# Patient Record
Sex: FEMALE | Race: ASIAN | Employment: OTHER | URBAN - METROPOLITAN AREA
[De-identification: names, ages, dates, MRNs, and addresses within clinical notes are randomized per-mention and may not be internally consistent; named-entity substitution may affect disease eponyms.]

---

## 2017-02-24 ENCOUNTER — LAB ENCOUNTER (OUTPATIENT)
Dept: LAB | Age: 72
End: 2017-02-24
Attending: INTERNAL MEDICINE
Payer: COMMERCIAL

## 2017-02-24 DIAGNOSIS — E55.9 VITAMIN D DEFICIENCY DISEASE: Primary | ICD-10-CM

## 2017-02-24 DIAGNOSIS — D64.9 ANEMIA: ICD-10-CM

## 2017-02-24 DIAGNOSIS — E03.9 HYPOTHYROIDISM: ICD-10-CM

## 2017-02-24 LAB
BASOPHILS # BLD: 0 K/UL (ref 0–0.2)
BASOPHILS NFR BLD: 1 %
EOSINOPHIL # BLD: 0.1 K/UL (ref 0–0.7)
EOSINOPHIL NFR BLD: 2 %
ERYTHROCYTE [DISTWIDTH] IN BLOOD BY AUTOMATED COUNT: 12.7 % (ref 11–15)
HCT VFR BLD AUTO: 36.2 % (ref 35–48)
HGB BLD-MCNC: 11.9 G/DL (ref 12–16)
LYMPHOCYTES # BLD: 1.3 K/UL (ref 1–4)
LYMPHOCYTES NFR BLD: 33 %
MCH RBC QN AUTO: 29.8 PG (ref 27–32)
MCHC RBC AUTO-ENTMCNC: 32.8 G/DL (ref 32–37)
MCV RBC AUTO: 90.9 FL (ref 80–100)
MONOCYTES # BLD: 0.3 K/UL (ref 0–1)
MONOCYTES NFR BLD: 8 %
NEUTROPHILS # BLD AUTO: 2.2 K/UL (ref 1.8–7.7)
NEUTROPHILS NFR BLD: 57 %
PLATELET # BLD AUTO: 223 K/UL (ref 140–400)
PMV BLD AUTO: 9.3 FL (ref 7.4–10.3)
RBC # BLD AUTO: 3.98 M/UL (ref 3.7–5.4)
TSH SERPL-ACNC: 3.77 UIU/ML (ref 0.34–5.6)
WBC # BLD AUTO: 3.8 K/UL (ref 4–11)

## 2017-02-24 PROCEDURE — 83921 ORGANIC ACID SINGLE QUANT: CPT

## 2017-02-24 PROCEDURE — 36415 COLL VENOUS BLD VENIPUNCTURE: CPT

## 2017-02-24 PROCEDURE — 84443 ASSAY THYROID STIM HORMONE: CPT

## 2017-02-24 PROCEDURE — 85025 COMPLETE CBC W/AUTO DIFF WBC: CPT

## 2017-02-24 PROCEDURE — 82306 VITAMIN D 25 HYDROXY: CPT

## 2017-02-27 LAB
25(OH)D3 SERPL-MCNC: 30.5 NG/ML
MMA: 0.13 UMOL/L

## 2017-08-26 ENCOUNTER — LAB ENCOUNTER (OUTPATIENT)
Dept: LAB | Age: 72
End: 2017-08-26
Attending: INTERNAL MEDICINE
Payer: MEDICARE

## 2017-08-26 DIAGNOSIS — E03.9 HYPOTHYROIDISM: ICD-10-CM

## 2017-08-26 DIAGNOSIS — Z00.00 ROUTINE GENERAL MEDICAL EXAMINATION AT A HEALTH CARE FACILITY: Primary | ICD-10-CM

## 2017-08-26 DIAGNOSIS — D64.9 ABSOLUTE ANEMIA: ICD-10-CM

## 2017-08-26 DIAGNOSIS — E55.9 AVITAMINOSIS D: ICD-10-CM

## 2017-08-26 LAB
ALBUMIN SERPL BCP-MCNC: 4 G/DL (ref 3.5–4.8)
ALBUMIN/GLOB SERPL: 1.4 {RATIO} (ref 1–2)
ALP SERPL-CCNC: 51 U/L (ref 32–100)
ALT SERPL-CCNC: 15 U/L (ref 14–54)
ANION GAP SERPL CALC-SCNC: 6 MMOL/L (ref 0–18)
AST SERPL-CCNC: 25 U/L (ref 15–41)
BASOPHILS # BLD: 0 K/UL (ref 0–0.2)
BASOPHILS NFR BLD: 1 %
BILIRUB SERPL-MCNC: 1 MG/DL (ref 0.3–1.2)
BUN SERPL-MCNC: 11 MG/DL (ref 8–20)
BUN/CREAT SERPL: 14.3 (ref 10–20)
CALCIUM SERPL-MCNC: 9.7 MG/DL (ref 8.5–10.5)
CHLORIDE SERPL-SCNC: 104 MMOL/L (ref 95–110)
CHOLEST SERPL-MCNC: 213 MG/DL (ref 110–200)
CO2 SERPL-SCNC: 28 MMOL/L (ref 22–32)
CREAT SERPL-MCNC: 0.77 MG/DL (ref 0.5–1.5)
EOSINOPHIL # BLD: 0.1 K/UL (ref 0–0.7)
EOSINOPHIL NFR BLD: 2 %
ERYTHROCYTE [DISTWIDTH] IN BLOOD BY AUTOMATED COUNT: 13.4 % (ref 11–15)
GLOBULIN PLAS-MCNC: 2.9 G/DL (ref 2.5–3.7)
GLUCOSE SERPL-MCNC: 85 MG/DL (ref 70–99)
HCT VFR BLD AUTO: 35.9 % (ref 35–48)
HDLC SERPL-MCNC: 87 MG/DL
HGB BLD-MCNC: 12 G/DL (ref 12–16)
LDLC SERPL CALC-MCNC: 118 MG/DL (ref 0–99)
LYMPHOCYTES # BLD: 1.5 K/UL (ref 1–4)
LYMPHOCYTES NFR BLD: 36 %
MCH RBC QN AUTO: 30 PG (ref 27–32)
MCHC RBC AUTO-ENTMCNC: 33.3 G/DL (ref 32–37)
MCV RBC AUTO: 90.1 FL (ref 80–100)
MONOCYTES # BLD: 0.4 K/UL (ref 0–1)
MONOCYTES NFR BLD: 8 %
NEUTROPHILS # BLD AUTO: 2.2 K/UL (ref 1.8–7.7)
NEUTROPHILS NFR BLD: 52 %
NONHDLC SERPL-MCNC: 126 MG/DL
OSMOLALITY UR CALC.SUM OF ELEC: 285 MOSM/KG (ref 275–295)
PLATELET # BLD AUTO: 209 K/UL (ref 140–400)
PMV BLD AUTO: 9.4 FL (ref 7.4–10.3)
POTASSIUM SERPL-SCNC: 5 MMOL/L (ref 3.3–5.1)
PROT SERPL-MCNC: 6.9 G/DL (ref 5.9–8.4)
RBC # BLD AUTO: 3.99 M/UL (ref 3.7–5.4)
SODIUM SERPL-SCNC: 138 MMOL/L (ref 136–144)
TRIGL SERPL-MCNC: 40 MG/DL (ref 1–149)
TSH SERPL-ACNC: 3.28 UIU/ML (ref 0.45–5.33)
WBC # BLD AUTO: 4.3 K/UL (ref 4–11)

## 2017-08-26 PROCEDURE — 80061 LIPID PANEL: CPT

## 2017-08-26 PROCEDURE — 85025 COMPLETE CBC W/AUTO DIFF WBC: CPT

## 2017-08-26 PROCEDURE — 84443 ASSAY THYROID STIM HORMONE: CPT

## 2017-08-26 PROCEDURE — 80053 COMPREHEN METABOLIC PANEL: CPT

## 2017-08-26 PROCEDURE — 36415 COLL VENOUS BLD VENIPUNCTURE: CPT

## 2017-08-26 PROCEDURE — 82306 VITAMIN D 25 HYDROXY: CPT

## 2017-08-28 LAB — 25(OH)D3 SERPL-MCNC: 32.5 NG/ML

## 2018-05-05 ENCOUNTER — LAB ENCOUNTER (OUTPATIENT)
Dept: LAB | Age: 73
End: 2018-05-05
Attending: INTERNAL MEDICINE
Payer: COMMERCIAL

## 2018-05-05 DIAGNOSIS — E78.6 FAMILIAL LIPOPROTEIN DEFICIENCY: Primary | ICD-10-CM

## 2018-05-05 DIAGNOSIS — E78.5 HYPERLIPEMIA: ICD-10-CM

## 2018-05-05 DIAGNOSIS — E55.9 AVITAMINOSIS D: ICD-10-CM

## 2018-05-05 DIAGNOSIS — D64.9 ANEMIA: ICD-10-CM

## 2018-05-05 DIAGNOSIS — E03.9 HYPOTHYROIDISM: ICD-10-CM

## 2018-05-05 PROCEDURE — 84443 ASSAY THYROID STIM HORMONE: CPT

## 2018-05-05 PROCEDURE — 36415 COLL VENOUS BLD VENIPUNCTURE: CPT

## 2018-05-05 PROCEDURE — 82306 VITAMIN D 25 HYDROXY: CPT

## 2018-05-05 PROCEDURE — 80061 LIPID PANEL: CPT

## 2018-05-05 PROCEDURE — 80053 COMPREHEN METABOLIC PANEL: CPT

## 2018-05-05 PROCEDURE — 85025 COMPLETE CBC W/AUTO DIFF WBC: CPT

## 2018-10-22 ENCOUNTER — APPOINTMENT (OUTPATIENT)
Dept: ULTRASOUND IMAGING | Facility: HOSPITAL | Age: 73
DRG: 312 | End: 2018-10-22
Attending: Other
Payer: MEDICARE

## 2018-10-22 ENCOUNTER — APPOINTMENT (OUTPATIENT)
Dept: GENERAL RADIOLOGY | Facility: HOSPITAL | Age: 73
DRG: 312 | End: 2018-10-22
Attending: EMERGENCY MEDICINE
Payer: MEDICARE

## 2018-10-22 ENCOUNTER — APPOINTMENT (OUTPATIENT)
Dept: CT IMAGING | Facility: HOSPITAL | Age: 73
DRG: 312 | End: 2018-10-22
Attending: EMERGENCY MEDICINE
Payer: MEDICARE

## 2018-10-22 ENCOUNTER — APPOINTMENT (OUTPATIENT)
Dept: MRI IMAGING | Facility: HOSPITAL | Age: 73
DRG: 312 | End: 2018-10-22
Attending: Other
Payer: MEDICARE

## 2018-10-22 ENCOUNTER — HOSPITAL ENCOUNTER (INPATIENT)
Facility: HOSPITAL | Age: 73
LOS: 1 days | Discharge: HOME | DRG: 312 | End: 2018-10-23
Attending: EMERGENCY MEDICINE | Admitting: INTERNAL MEDICINE
Payer: MEDICARE

## 2018-10-22 ENCOUNTER — APPOINTMENT (OUTPATIENT)
Dept: CV DIAGNOSTICS | Facility: HOSPITAL | Age: 73
DRG: 312 | End: 2018-10-22
Attending: INTERNAL MEDICINE
Payer: MEDICARE

## 2018-10-22 DIAGNOSIS — R55 SYNCOPE AND COLLAPSE: Primary | ICD-10-CM

## 2018-10-22 DIAGNOSIS — Z86.73 HISTORY OF STROKE: ICD-10-CM

## 2018-10-22 PROCEDURE — 71045 X-RAY EXAM CHEST 1 VIEW: CPT | Performed by: EMERGENCY MEDICINE

## 2018-10-22 PROCEDURE — 95816 EEG AWAKE AND DROWSY: CPT | Performed by: OTHER

## 2018-10-22 PROCEDURE — 72125 CT NECK SPINE W/O DYE: CPT | Performed by: EMERGENCY MEDICINE

## 2018-10-22 PROCEDURE — 73110 X-RAY EXAM OF WRIST: CPT | Performed by: EMERGENCY MEDICINE

## 2018-10-22 PROCEDURE — 93306 TTE W/DOPPLER COMPLETE: CPT | Performed by: INTERNAL MEDICINE

## 2018-10-22 PROCEDURE — 70553 MRI BRAIN STEM W/O & W/DYE: CPT | Performed by: OTHER

## 2018-10-22 PROCEDURE — 99223 1ST HOSP IP/OBS HIGH 75: CPT | Performed by: OTHER

## 2018-10-22 PROCEDURE — 93880 EXTRACRANIAL BILAT STUDY: CPT | Performed by: OTHER

## 2018-10-22 PROCEDURE — 70450 CT HEAD/BRAIN W/O DYE: CPT | Performed by: EMERGENCY MEDICINE

## 2018-10-22 RX ORDER — LEVOTHYROXINE SODIUM 0.05 MG/1
50 TABLET ORAL
Status: DISCONTINUED | OUTPATIENT
Start: 2018-10-22 | End: 2018-10-23

## 2018-10-22 RX ORDER — 0.9 % SODIUM CHLORIDE 0.9 %
VIAL (ML) INJECTION
Status: COMPLETED
Start: 2018-10-22 | End: 2018-10-22

## 2018-10-22 RX ORDER — ASPIRIN 81 MG/1
81 TABLET ORAL DAILY
Status: DISCONTINUED | OUTPATIENT
Start: 2018-10-22 | End: 2018-10-23

## 2018-10-22 RX ORDER — PRAMIPEXOLE DIHYDROCHLORIDE 0.12 MG/1
0.12 TABLET ORAL NIGHTLY
COMMUNITY

## 2018-10-22 RX ORDER — ACETAMINOPHEN 325 MG/1
650 TABLET ORAL EVERY 6 HOURS PRN
Status: DISCONTINUED | OUTPATIENT
Start: 2018-10-22 | End: 2018-10-23

## 2018-10-22 RX ORDER — PRAMIPEXOLE DIHYDROCHLORIDE 0.12 MG/1
0.12 TABLET ORAL NIGHTLY
Status: DISCONTINUED | OUTPATIENT
Start: 2018-10-22 | End: 2018-10-23

## 2018-10-22 RX ORDER — HYDROCODONE BITARTRATE AND ACETAMINOPHEN 5; 325 MG/1; MG/1
1 TABLET ORAL EVERY 6 HOURS PRN
Status: DISCONTINUED | OUTPATIENT
Start: 2018-10-22 | End: 2018-10-23

## 2018-10-22 RX ORDER — LEVOTHYROXINE SODIUM 0.05 MG/1
50 TABLET ORAL
COMMUNITY

## 2018-10-22 NOTE — ED NOTES
Pt presents with family,  states pt was in the bathroom getting ready to take a shower when he heard a thud. Found pt laying on floor, unknown LOC or head trauma. Pt vomit x 2.   Swelling noted to right wrist.

## 2018-10-22 NOTE — ED INITIAL ASSESSMENT (HPI)
Per family, pt had a syncopal episode while getting up to go to bathroom this am at 0730. Unknown loc. Unknown head injury. Pt has vomited x 2 since. Per pt , he heard pt fall and found her on the bathroom floor. Pt a/o x 4 now. Appears sleepy.

## 2018-10-22 NOTE — CONSULTS
[facility] HOSP - [facility]    Report of Consultation    Alma Rodriguez Patient Status:  Inpatient    3/15/1945 MRN [id number]   Location [facility] 3W/SW Attending Baxter Lefort, MD   Hosp Day # 0 PCP Malcolm Kay MD     Date of Admission:  10/ Allergies    Review of Systems:   As in HPI, the rest of the 14 system review was done and was negative    Physical Exam:      10/22/18  0945 10/22/18  1000 10/22/18  1114 10/22/18  1144   BP: 160/82 (!) 165/90 135/71 151/81   Pulse: 61 73 63 65   Resp: 15 test    Results:     Laboratory Data:  Lab Results   Component Value Date    WBC 5.9 10/22/2018    HGB 11.8 (L) 10/22/2018    HCT 35.8 10/22/2018     10/22/2018    CREATSERUM 0.80 10/22/2018    BUN 9 10/22/2018     10/22/2018    K 4.6 10/22/20 scarring/atelectasis. Moderate thoracolumbar scoliosis.      Dictated by (CST): Glenn Mckeon MD on 10/22/2018 at 9:18     Approved by (CST): Glenn Mckeon MD on 10/22/2018 at 9:20          Ekg 12-lead    Result Date: 10/22/2018  ECG Report  Interpretatio

## 2018-10-22 NOTE — ED PROVIDER NOTES
Patient Seen in: [facility] AND [facility] Emergency Department    History   Patient presents with:  Syncope (cardiovascular, neurologic)    Stated Complaint: fall this am at 0730 while going to bathroom    HPI    78-year-old female presents for evaluation after Conjunctivae and EOM are normal. Pupils are equal, round, and reactive to light. Neck: Normal range of motion. Neck supple. Cardiovascular: Normal rate, regular rhythm and intact distal pulses. Murmur heard.   Radial and pedal pulses 2+ bilaterally EKG    Rate, intervals and axes as noted on EKG Report.     QRS 99  Rate: 56  Rhythm: Sinus Rhythm  Reading: Sinus bradycardia              Pulse Ox: 99%, Normal, RA    Cardiac Monitor: Pulse Readings from Last 1 Encounters:  10/22/18 : 67 Sonam Salazar MD on 10/22/2018 at 16:52     Approved by (CST): Mey Hernández MD on 10/22/2018 at 16:58          Us Carotid Doppler Bilat - Diag Img (cpt=93880)    Result Date: 10/22/2018  CONCLUSION:  1.  There is significant area stenosis or significant plaqu

## 2018-10-22 NOTE — CONSULTS
[facility] HOSP - [facility]    Report of Cardiology Consultation    Darell Hidalgo Patient Status:  Emergency    3/15/1945 MRN [id number]   Location [address] Attending Adriel Sanchez MD   [facility] Day # 0 PCP [facility], oriented. No apparent distress. No respiratory or constitutional distress. HEENT: Normocephalic, anicteric sclera, neck supple  Neck: No JVD, carotids 2+,  Cardiac: Regular rate and rhythm. No pathologic murmur. Lungs: Clear with normal effort.   Normal e

## 2018-10-22 NOTE — PROCEDURES
EEG report    REFERRING PHYSICIAN: Magdiel Gee MD    PCP and phone number:  Aravind Otero MD  [phone]    TECHNIQUE: 21 channels of EEG, 2 channels of EOG, and 1 channel of EKG were recorded utilizing the International 10/20 System.  The recording wa

## 2018-10-22 NOTE — H&P
[facility] Patient Status:  Inpatient    3/15/1945 MRN [id number]   Location [facility] 3W/SW Attending Jesenia Goetz MD   Hosp Day # 0 PCP Yomi Vazquez MD     Date:  10/22/2018  Date of Clear to auscultation bilaterally, respirations unlabored    Heart:    Regular rate and rhythm, S1 and S2 normal, no murmur, rub   or gallop   Abdomen:     Soft, non-tender, bowel sounds active all four quadrants,     no masses, no organomegaly   Extremiti based on the clinical documentation in H+P. Based on patients current state of illness, I anticipate that, after discharge, patient will require TBD.       [facility]  10/22/2018  12:48 PM

## 2018-10-23 ENCOUNTER — APPOINTMENT (OUTPATIENT)
Dept: GENERAL RADIOLOGY | Facility: HOSPITAL | Age: 73
DRG: 312 | End: 2018-10-23
Attending: ORTHOPAEDIC SURGERY
Payer: MEDICARE

## 2018-10-23 VITALS
WEIGHT: 105.19 LBS | DIASTOLIC BLOOD PRESSURE: 61 MMHG | HEART RATE: 71 BPM | OXYGEN SATURATION: 99 % | HEIGHT: 62 IN | BODY MASS INDEX: 19.36 KG/M2 | SYSTOLIC BLOOD PRESSURE: 116 MMHG | RESPIRATION RATE: 18 BRPM | TEMPERATURE: 98 F

## 2018-10-23 PROBLEM — S62.101A WRIST FRACTURE, RIGHT: Status: ACTIVE | Noted: 2018-10-23

## 2018-10-23 PROBLEM — G25.81 RLS (RESTLESS LEGS SYNDROME): Chronic | Status: ACTIVE | Noted: 2018-10-23

## 2018-10-23 PROCEDURE — 73110 X-RAY EXAM OF WRIST: CPT | Performed by: ORTHOPAEDIC SURGERY

## 2018-10-23 PROCEDURE — 99232 SBSQ HOSP IP/OBS MODERATE 35: CPT | Performed by: OTHER

## 2018-10-23 RX ORDER — MORPHINE SULFATE 2 MG/ML
2 INJECTION, SOLUTION INTRAMUSCULAR; INTRAVENOUS ONCE
Status: COMPLETED | OUTPATIENT
Start: 2018-10-23 | End: 2018-10-23

## 2018-10-23 RX ORDER — ATORVASTATIN CALCIUM 10 MG/1
10 TABLET, FILM COATED ORAL NIGHTLY
Qty: 30 TABLET | Refills: 0 | Status: SHIPPED | OUTPATIENT
Start: 2018-10-23

## 2018-10-23 RX ORDER — ASPIRIN 81 MG/1
81 TABLET ORAL DAILY
Qty: 30 TABLET | Refills: 0 | Status: SHIPPED | OUTPATIENT
Start: 2018-10-24

## 2018-10-23 RX ORDER — ATORVASTATIN CALCIUM 10 MG/1
10 TABLET, FILM COATED ORAL NIGHTLY
Status: DISCONTINUED | OUTPATIENT
Start: 2018-10-23 | End: 2018-10-23

## 2018-10-23 RX ORDER — ACETAMINOPHEN 325 MG/1
650 TABLET ORAL EVERY 6 HOURS PRN
Qty: 30 TABLET | Refills: 0 | Status: SHIPPED | OUTPATIENT
Start: 2018-10-23

## 2018-10-23 NOTE — PROGRESS NOTES
[facility]    Cardiology Progress Note    Gerhardt Genta Patient Status:  Inpatient    3/15/1945 MRN [id number]   Location [facility] 3W/SW Attending Jen Parker MD   Hosp Day # 1 PCP Zulay Esquivel MD         Assessment and Pl ulnar styloid. 2. Osteoarthritis. 3. Demineralization. 4. Soft tissue swelling. Dictated by (CST): Gaby Colon MD on 10/22/2018 at 9:32     Approved by (CST):  Gaby Colon MD on 10/22/2018 at [facility] ([id number])    Result 50-69%. The elevation of velocity at this site may also be related to the tortuosity of the mid left ICA. Correlate clinically and with possible followup CTA/MRA may be of help.     Dictated by (CST): Lisa Kelly MD on 10/22/2018 at 17:38     Approved by

## 2018-10-23 NOTE — PROGRESS NOTES
[facility]    Progress Note    Chema Cowing Patient Status:  Inpatient    3/15/1945 MRN [id number]   Location [facility] 3W/SW Attending Nisreen Butler MD   Hosp Day # 1 PCP Janette John MD       Interval History:   Awake, c/o for input(s): ALKPHO, AST, ALT, BILT, TP, PT, INR, PTT in the last 168 hours. Xr Wrist Complete (min 3 Views), Right (cpt=73110)    Result Date: 10/22/2018  CONCLUSION:  1. Fractures of the distal radius and ulnar styloid. 2. Osteoarthritis.  3. Jenny Nichols significant area stenosis or significant plaque in the right carotid system as discussed. 2. There is elevation of the velocity of flow at the mid left ICA which may suggest a degree of stenosis of between 50-69%.  The elevation of velocity at this site may

## 2018-10-23 NOTE — TRANSITION NOTE
For Cardiology Office:    Date of Admission:  10/22/2018  Date of Consult:  10/22/2018     Impression:      1) Syncope                C/w VVS                  2) Abnl ekg                Poor R wave progression                Ordered echo to rule out LV dys

## 2018-10-23 NOTE — PROGRESS NOTES
Pt d/c stable with family, home via private vehicle. Pt and family educated on use of sling for casted arm; pt/family demonstrated knowledge of use of sling and proper elevation of arm when lying down.

## 2018-10-23 NOTE — PROGRESS NOTES
[facility]  Neurology Progress Note    Jeevan Vásquez Patient Status:  Inpatient    3/15/1945 MRN [id number]   Location [facility] 3W/SW Attending Tapan Soliz MD   Hosp Day # 1 PCP Dolph Hammans, MD     Subjective:  Jeevan Vásquez is a elevates symmetrically. XI. Shoulder shrug is intact  XII.  Tongue is midline    Motor Exam:  Muscle tone normal  No atrophy or fasciculations  Strength- upper extremities 5/5 proximally and distally, except unable to test the right arm due to the splint Multilevel degenerative disc disease and spondylosis. Mild central narrowing at C3-4. 3. Atherosclerotic vascular calcification.      Dictated by (CST): Kailash Chan MD on 10/22/2018 at 10:20     Approved by (CST): Kailash Chan MD on 10/22/2018 at 10:25 Lita Saxena MD    MRI of the brain was independently reviewed, still the same old lacunar stroke noted in the left internal capsule, but no acute abnormalities.     Assessment:  Patient Active Problem List:     Syncope and collapse     Wrist fracture, right

## 2018-10-23 NOTE — PLAN OF CARE
Problem: Patient/Family Goals  Goal: Patient/Family Long Term Goal  Patient's Long Term Goal: no more syncope episode. Interventions:    - monitor vitals . - ct scan negative. - follow up with PCP.    - See additional Care Plan goals for specific

## 2018-10-23 NOTE — CONSULTS
Alicja Chong    75year old  Wt Readings from Last 6 Encounters:  10/23/18 : 105 lb 3.2 oz (47.7 kg)        Patient presents with:  Syncope (cardiovascular, neurologic)      HPI    Past Medical History:   Diagnosis Date   • Disorder of thyroid    • RLS refill. Compartments soft.     Lab Results   Component Value Date    WBC 6.7 10/23/2018    HGB 11.2 10/23/2018    HCT 33.9 10/23/2018     10/23/2018    CREATSERUM 0.67 10/23/2018    BUN 8 10/23/2018     10/23/2018    K 3.9 10/23/2018     infarction involving anterior limb of the left internal capsule.      Dictated by (CST): Silvano Almazan MD on 10/22/2018 at 16:52     Approved by (CST): Silvano Almazan MD on 10/22/2018 at 16:58           Carotid Doppler Bilat - Diag Img (cpt=93880)    R

## 2020-11-02 ENCOUNTER — TELEPHONE (OUTPATIENT)
Dept: NEUROSURGERY | Age: 75
End: 2020-11-02

## 2020-11-02 ENCOUNTER — APPOINTMENT (OUTPATIENT)
Dept: NEUROSURGERY | Age: 75
End: 2020-11-02

## 2020-11-02 DIAGNOSIS — M54.50 LOW BACK PAIN, UNSPECIFIED BACK PAIN LATERALITY, UNSPECIFIED CHRONICITY, UNSPECIFIED WHETHER SCIATICA PRESENT: ICD-10-CM

## 2020-11-02 DIAGNOSIS — M79.605 LEFT LEG PAIN: Primary | ICD-10-CM

## 2020-11-03 ENCOUNTER — OFFICE VISIT (OUTPATIENT)
Dept: NEUROSURGERY | Age: 75
End: 2020-11-03

## 2020-11-03 ENCOUNTER — TELEPHONE (OUTPATIENT)
Dept: SCHEDULING | Age: 75
End: 2020-11-03

## 2020-11-03 ENCOUNTER — TELEPHONE (OUTPATIENT)
Dept: NEUROSURGERY | Age: 75
End: 2020-11-03

## 2020-11-03 ENCOUNTER — IMAGING SERVICES (OUTPATIENT)
Dept: GENERAL RADIOLOGY | Age: 75
End: 2020-11-03

## 2020-11-03 VITALS
HEART RATE: 65 BPM | HEIGHT: 61 IN | TEMPERATURE: 97 F | RESPIRATION RATE: 18 BRPM | BODY MASS INDEX: 20.01 KG/M2 | DIASTOLIC BLOOD PRESSURE: 93 MMHG | WEIGHT: 106 LBS | SYSTOLIC BLOOD PRESSURE: 195 MMHG

## 2020-11-03 DIAGNOSIS — M54.50 LOW BACK PAIN, UNSPECIFIED BACK PAIN LATERALITY, UNSPECIFIED CHRONICITY, UNSPECIFIED WHETHER SCIATICA PRESENT: ICD-10-CM

## 2020-11-03 DIAGNOSIS — M41.55 OTHER SECONDARY SCOLIOSIS, THORACOLUMBAR REGION: ICD-10-CM

## 2020-11-03 DIAGNOSIS — M54.42 ACUTE LEFT-SIDED LOW BACK PAIN WITH LEFT-SIDED SCIATICA: Primary | ICD-10-CM

## 2020-11-03 DIAGNOSIS — M79.605 LEFT LEG PAIN: ICD-10-CM

## 2020-11-03 PROCEDURE — 99202 OFFICE O/P NEW SF 15 MIN: CPT | Performed by: PHYSICIAN ASSISTANT

## 2020-11-03 PROCEDURE — 72110 X-RAY EXAM L-2 SPINE 4/>VWS: CPT | Performed by: PHYSICIAN ASSISTANT

## 2020-11-03 RX ORDER — PNV NO.95/FERROUS FUM/FOLIC AC 28MG-0.8MG
TABLET ORAL EVERY 24 HOURS
COMMUNITY

## 2020-11-03 RX ORDER — IBUPROFEN 800 MG
TABLET ORAL
COMMUNITY

## 2020-11-03 RX ORDER — PRAMIPEXOLE DIHYDROCHLORIDE 0.12 MG/1
TABLET ORAL EVERY 24 HOURS
COMMUNITY

## 2020-11-03 RX ORDER — METHYLPREDNISOLONE 4 MG
TABLET, DOSE PACK ORAL
Qty: 21 TABLET | Refills: 0 | Status: SHIPPED | OUTPATIENT
Start: 2020-11-03

## 2020-11-03 RX ORDER — LEVOTHYROXINE SODIUM 0.05 MG/1
50 TABLET ORAL
COMMUNITY

## 2020-11-03 RX ORDER — GABAPENTIN 300 MG/1
300 CAPSULE ORAL 3 TIMES DAILY
Qty: 90 CAPSULE | Refills: 0 | Status: SHIPPED | OUTPATIENT
Start: 2020-11-03

## 2020-11-03 RX ORDER — GABAPENTIN 100 MG/1
100 CAPSULE ORAL 2 TIMES DAILY
COMMUNITY
Start: 2020-10-22 | End: 2020-11-03 | Stop reason: DRUGHIGH

## 2020-11-03 SDOH — HEALTH STABILITY: MENTAL HEALTH: HOW OFTEN DO YOU HAVE A DRINK CONTAINING ALCOHOL?: NEVER

## 2020-11-03 ASSESSMENT — ENCOUNTER SYMPTOMS
BACK PAIN: 0
WEAKNESS: 0
NUMBNESS: 0
CONSTITUTIONAL NEGATIVE: 1
PSYCHIATRIC NEGATIVE: 1

## 2020-11-03 ASSESSMENT — VISUAL ACUITY: VA_NORMAL: 1

## 2020-11-06 ENCOUNTER — IMAGING SERVICES (OUTPATIENT)
Dept: OTHER | Age: 75
End: 2020-11-06

## 2020-11-06 ENCOUNTER — APPOINTMENT (OUTPATIENT)
Dept: NEUROSURGERY | Age: 75
End: 2020-11-06

## 2020-11-06 ENCOUNTER — TELEPHONE (OUTPATIENT)
Dept: NEUROSURGERY | Age: 75
End: 2020-11-06

## 2020-11-13 ENCOUNTER — APPOINTMENT (OUTPATIENT)
Dept: MRI IMAGING | Age: 75
End: 2020-11-13
Attending: PHYSICIAN ASSISTANT